# Patient Record
Sex: MALE | Race: WHITE | URBAN - METROPOLITAN AREA
[De-identification: names, ages, dates, MRNs, and addresses within clinical notes are randomized per-mention and may not be internally consistent; named-entity substitution may affect disease eponyms.]

---

## 2019-07-30 ENCOUNTER — OUTPATIENT (OUTPATIENT)
Dept: OUTPATIENT SERVICES | Facility: HOSPITAL | Age: 84
LOS: 1 days | End: 2019-07-30

## 2019-07-30 ENCOUNTER — EMERGENCY (EMERGENCY)
Facility: HOSPITAL | Age: 84
LOS: 1 days | End: 2019-07-30
Admitting: INTERNAL MEDICINE
Payer: MEDICARE

## 2019-07-30 PROCEDURE — 99284 EMERGENCY DEPT VISIT MOD MDM: CPT

## 2019-07-30 PROCEDURE — 93010 ELECTROCARDIOGRAM REPORT: CPT

## 2019-07-30 PROCEDURE — 70450 CT HEAD/BRAIN W/O DYE: CPT | Mod: 26

## 2019-07-30 PROCEDURE — 71046 X-RAY EXAM CHEST 2 VIEWS: CPT | Mod: 26

## 2019-07-31 ENCOUNTER — OUTPATIENT (OUTPATIENT)
Dept: OUTPATIENT SERVICES | Facility: HOSPITAL | Age: 84
LOS: 1 days | End: 2019-07-31

## 2019-07-31 PROCEDURE — 99285 EMERGENCY DEPT VISIT HI MDM: CPT

## 2019-09-08 ENCOUNTER — INPATIENT (INPATIENT)
Facility: HOSPITAL | Age: 84
LOS: 1 days | Discharge: HOME | DRG: 303 | End: 2019-09-10
Attending: INTERNAL MEDICINE | Admitting: HOSPITALIST
Payer: OTHER GOVERNMENT

## 2019-09-08 VITALS
RESPIRATION RATE: 20 BRPM | TEMPERATURE: 98 F | DIASTOLIC BLOOD PRESSURE: 78 MMHG | HEART RATE: 68 BPM | HEIGHT: 71 IN | SYSTOLIC BLOOD PRESSURE: 151 MMHG | WEIGHT: 223.11 LBS | OXYGEN SATURATION: 99 %

## 2019-09-08 DIAGNOSIS — Z95.2 PRESENCE OF PROSTHETIC HEART VALVE: Chronic | ICD-10-CM

## 2019-09-08 DIAGNOSIS — I20.0 UNSTABLE ANGINA: ICD-10-CM

## 2019-09-08 LAB
ALBUMIN SERPL ELPH-MCNC: 3.3 G/DL — SIGNIFICANT CHANGE UP (ref 3.3–5.2)
ALP SERPL-CCNC: 129 U/L — HIGH (ref 40–120)
ALT FLD-CCNC: 12 U/L — SIGNIFICANT CHANGE UP
ANION GAP SERPL CALC-SCNC: 7 MMOL/L — SIGNIFICANT CHANGE UP (ref 5–17)
AST SERPL-CCNC: 18 U/L — SIGNIFICANT CHANGE UP
BILIRUB SERPL-MCNC: 0.7 MG/DL — SIGNIFICANT CHANGE UP (ref 0.4–2)
BUN SERPL-MCNC: 18 MG/DL — SIGNIFICANT CHANGE UP (ref 8–20)
CALCIUM SERPL-MCNC: 10.3 MG/DL — HIGH (ref 8.6–10.2)
CHLORIDE SERPL-SCNC: 104 MMOL/L — SIGNIFICANT CHANGE UP (ref 98–107)
CO2 SERPL-SCNC: 26 MMOL/L — SIGNIFICANT CHANGE UP (ref 22–29)
CREAT SERPL-MCNC: 0.79 MG/DL — SIGNIFICANT CHANGE UP (ref 0.5–1.3)
GLUCOSE SERPL-MCNC: 125 MG/DL — HIGH (ref 70–115)
HCT VFR BLD CALC: 34.1 % — LOW (ref 39–50)
HGB BLD-MCNC: 10.9 G/DL — LOW (ref 13–17)
LIDOCAIN IGE QN: 25 U/L — SIGNIFICANT CHANGE UP (ref 22–51)
MCHC RBC-ENTMCNC: 30.6 PG — SIGNIFICANT CHANGE UP (ref 27–34)
MCHC RBC-ENTMCNC: 32 GM/DL — SIGNIFICANT CHANGE UP (ref 32–36)
MCV RBC AUTO: 95.8 FL — SIGNIFICANT CHANGE UP (ref 80–100)
NT-PROBNP SERPL-SCNC: 392 PG/ML — HIGH (ref 0–300)
PLATELET # BLD AUTO: 184 K/UL — SIGNIFICANT CHANGE UP (ref 150–400)
POTASSIUM SERPL-MCNC: 3.7 MMOL/L — SIGNIFICANT CHANGE UP (ref 3.5–5.3)
POTASSIUM SERPL-SCNC: 3.7 MMOL/L — SIGNIFICANT CHANGE UP (ref 3.5–5.3)
PROT SERPL-MCNC: 6 G/DL — LOW (ref 6.6–8.7)
RBC # BLD: 3.56 M/UL — LOW (ref 4.2–5.8)
RBC # FLD: 14.1 % — SIGNIFICANT CHANGE UP (ref 10.3–14.5)
SODIUM SERPL-SCNC: 137 MMOL/L — SIGNIFICANT CHANGE UP (ref 135–145)
TROPONIN T SERPL-MCNC: <0.01 NG/ML — SIGNIFICANT CHANGE UP (ref 0–0.06)
WBC # BLD: 7.39 K/UL — SIGNIFICANT CHANGE UP (ref 3.8–10.5)
WBC # FLD AUTO: 7.39 K/UL — SIGNIFICANT CHANGE UP (ref 3.8–10.5)

## 2019-09-08 PROCEDURE — 71046 X-RAY EXAM CHEST 2 VIEWS: CPT | Mod: 26

## 2019-09-08 PROCEDURE — 99285 EMERGENCY DEPT VISIT HI MDM: CPT

## 2019-09-08 PROCEDURE — 99223 1ST HOSP IP/OBS HIGH 75: CPT

## 2019-09-08 RX ORDER — METOPROLOL TARTRATE 50 MG
25 TABLET ORAL
Refills: 0 | Status: DISCONTINUED | OUTPATIENT
Start: 2019-09-08 | End: 2019-09-10

## 2019-09-08 RX ORDER — ATORVASTATIN CALCIUM 80 MG/1
20 TABLET, FILM COATED ORAL AT BEDTIME
Refills: 0 | Status: DISCONTINUED | OUTPATIENT
Start: 2019-09-08 | End: 2019-09-10

## 2019-09-08 RX ORDER — HYDROCHLOROTHIAZIDE 25 MG
12.5 TABLET ORAL
Refills: 0 | Status: DISCONTINUED | OUTPATIENT
Start: 2019-09-08 | End: 2019-09-10

## 2019-09-08 RX ORDER — NITROGLYCERIN 6.5 MG
0.4 CAPSULE, EXTENDED RELEASE ORAL
Refills: 0 | Status: COMPLETED | OUTPATIENT
Start: 2019-09-08 | End: 2019-09-09

## 2019-09-08 RX ORDER — LISINOPRIL 2.5 MG/1
5 TABLET ORAL DAILY
Refills: 0 | Status: DISCONTINUED | OUTPATIENT
Start: 2019-09-08 | End: 2019-09-10

## 2019-09-08 RX ORDER — CLOPIDOGREL BISULFATE 75 MG/1
75 TABLET, FILM COATED ORAL DAILY
Refills: 0 | Status: DISCONTINUED | OUTPATIENT
Start: 2019-09-08 | End: 2019-09-10

## 2019-09-08 RX ORDER — ENOXAPARIN SODIUM 100 MG/ML
40 INJECTION SUBCUTANEOUS DAILY
Refills: 0 | Status: DISCONTINUED | OUTPATIENT
Start: 2019-09-08 | End: 2019-09-08

## 2019-09-08 RX ORDER — ASPIRIN/CALCIUM CARB/MAGNESIUM 324 MG
81 TABLET ORAL DAILY
Refills: 0 | Status: DISCONTINUED | OUTPATIENT
Start: 2019-09-08 | End: 2019-09-10

## 2019-09-08 NOTE — ED PROVIDER NOTE - PMH
Essential hypertension    Myocardial infarction, unspecified MI type, unspecified artery    Stented coronary artery

## 2019-09-08 NOTE — H&P ADULT - HISTORY OF PRESENT ILLNESS
86yoM hx HTN, CAD/MI last PCI (7/2018), s/p mechanical aortic valve replacement (~2000) on coumadin, presenting with intermittent chest pain for the past few days.  Reports primarily left-sided dull chest pain radiating to his left shoulder that occurs at rest and on exertion.  Episodes last for a few minutes and have improved with Tylenol and ASA.  Denies any associated dyspnea, diaphoresis, nausea, vomiting.  Of note, reports hx of episodic lightheadedness of unclear etiology over the past few years. Has had multiple prior neuroimaging including CT and MRIs that have been non-revealing and pt says these symptoms haven't improved with meclizine.
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
## 2019-09-08 NOTE — H&P ADULT - NSHPLABSRESULTS_GEN_ALL_CORE
10.9   7.39  )-----------( 184      ( 08 Sep 2019 19:38 )             34.1       09-08    137  |  104  |  18.0  ----------------------------<  125<H>  3.7   |  26.0  |  0.79    Ca    10.3<H>      08 Sep 2019 19:38    TPro  6.0<L>  /  Alb  3.3  /  TBili  0.7  /  DBili  x   /  AST  18  /  ALT  12  /  AlkPhos  129<H>  09-08      EKG: NSR, 1st degree AV block, QRS widening, no ST-T changes, no prior EKG for comparison    CXR: ?RML nodular, no infiltrates or effusions noted

## 2019-09-08 NOTE — H&P ADULT - NSICDXPASTMEDICALHX_GEN_ALL_CORE_FT
PAST MEDICAL HISTORY:  Coronary artery disease     Essential hypertension     Myocardial infarction, unspecified MI type, unspecified artery 7/2018    Stented coronary artery

## 2019-09-08 NOTE — ED ADULT NURSE NOTE - NSIMPLEMENTINTERV_GEN_ALL_ED
Implemented All Fall with Harm Risk Interventions:  Ware to call system. Call bell, personal items and telephone within reach. Instruct patient to call for assistance. Room bathroom lighting operational. Non-slip footwear when patient is off stretcher. Physically safe environment: no spills, clutter or unnecessary equipment. Stretcher in lowest position, wheels locked, appropriate side rails in place. Provide visual cue, wrist band, yellow gown, etc. Monitor gait and stability. Monitor for mental status changes and reorient to person, place, and time. Review medications for side effects contributing to fall risk. Reinforce activity limits and safety measures with patient and family. Provide visual clues: red socks.

## 2019-09-08 NOTE — ED ADULT NURSE REASSESSMENT NOTE - NS ED NURSE REASSESS COMMENT FT1
Pt given turkey sandwich and hot dinner as per his request, MD choudhary to eat regular diet. Pt on cardiac monitor. To be admitted to TELE. Pt aware of plan of care; A/Ox4. Call bell and urinal within reach.

## 2019-09-08 NOTE — ED PROVIDER NOTE - PROGRESS NOTE DETAILS
Patient stable. Will place in obs given heart score of 5. Patient concerned regarding his recurrent dizziness over the past year. However, he endorses that he has been evaluated several times since its onset; labs, 3 ct heads, and MRI which were all normal. He staets the episods occur every morning when he wakes up and gradually wears off within an hour or so. However, he states he does get episodes during the day.

## 2019-09-08 NOTE — H&P ADULT - ASSESSMENT
86yoM hx HTN, CAD/MI last PCI (7/2018), s/p mechanical aortic valve replacement (~2000) on coumadin, presenting with intermittent chest pain for the past few days concerning for unstable angina    #Chest pain in setting of CAD concerning for unstable angina  -EKG showing NSR, 1st degree AV block and QRS widening without any prior EKG for comparison  -First set of troponin negative, repeat in AM  -Resumed ASA, plavix, statin  -Cardiology consult ([facility] as pt cardiologist doesn't come here)   -Pt reports recent TTE done about 1 week at [facility], try to obtain results    #HTN- Metoprolol, lisinopril and HCTZ resumed.    #Status post aortic valve replacement- No admission coag available, will check STAT INR.  Dose coumadin by daily INR.    #Prophylactic measure- on coumadin.
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
## 2019-09-08 NOTE — H&P ADULT - NSHPPHYSICALEXAM_GEN_ALL_CORE
Vital Signs Last 24 Hrs  T(C): 36.6 (08 Sep 2019 19:39), Max: 36.7 (08 Sep 2019 17:30)  T(F): 97.8 (08 Sep 2019 19:39), Max: 98 (08 Sep 2019 17:30)  HR: 61 (08 Sep 2019 19:39) (61 - 68)  BP: 137/65 (08 Sep 2019 19:39) (137/65 - 151/78)  BP(mean): 94 (08 Sep 2019 19:39) (94 - 94)  RR: 18 (08 Sep 2019 19:39) (18 - 20)  SpO2: 100% (08 Sep 2019 19:39) (99% - 100%)    GENERAL:  Well-appearing elderly male, not in acute distress  EYES:  Clear conjunctiva, extraocular movement intact  ENT: Moist mucous membranes  RESP:  Non-labored breathing pattern, lungs clear to ausculation   CV: Regular rate and rhythm, no murmurs appreciated, no lower extremity edema  GI: Soft, non-tender, non-distended  NEURO: Awake, alert, conversant, non-focal  PSYCH: Calm, cooperative  SKIN: No rash or lesions, warm and dry Vital Signs Last 24 Hrs  T(C): 36.6 (08 Sep 2019 19:39), Max: 36.7 (08 Sep 2019 17:30)  T(F): 97.8 (08 Sep 2019 19:39), Max: 98 (08 Sep 2019 17:30)  HR: 61 (08 Sep 2019 19:39) (61 - 68)  BP: 137/65 (08 Sep 2019 19:39) (137/65 - 151/78)  BP(mean): 94 (08 Sep 2019 19:39) (94 - 94)  RR: 18 (08 Sep 2019 19:39) (18 - 20)  SpO2: 100% (08 Sep 2019 19:39) (99% - 100%)    GENERAL:  Well-appearing elderly male, not in acute distress  EYES:  Clear conjunctiva, extraocular movement intact  ENT: Moist mucous membranes  RESP:  Non-labored breathing pattern, lungs clear to ausculation   CV: Regular rate and rhythm, +valve click, no murmurs appreciated, no lower extremity edema  GI: Soft, non-tender, non-distended  NEURO: Awake, alert, conversant, non-focal  PSYCH: Calm, cooperative  SKIN: No rash or lesions, warm and dry
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
## 2019-09-08 NOTE — ED PROVIDER NOTE - OBJECTIVE STATEMENT
85 yo bubba with hx of htn, MI w/ one stent presenting today for evaluation of recurrent left sided chest pain to the left shoulder  at rest and with exertion but no change with movement of upper extremities.

## 2019-09-08 NOTE — ED ADULT NURSE REASSESSMENT NOTE - NS ED NURSE REASSESS COMMENT FT1
Pt received awake and alert on cardiac monitor. Pt states no pain or discomfort at this time. Pt aware of plan of care - awaiting xray and lab results. Call bell within reach.

## 2019-09-09 DIAGNOSIS — I10 ESSENTIAL (PRIMARY) HYPERTENSION: ICD-10-CM

## 2019-09-09 DIAGNOSIS — I25.10 ATHEROSCLEROTIC HEART DISEASE OF NATIVE CORONARY ARTERY WITHOUT ANGINA PECTORIS: ICD-10-CM

## 2019-09-09 DIAGNOSIS — Z95.2 PRESENCE OF PROSTHETIC HEART VALVE: ICD-10-CM

## 2019-09-09 LAB
ANION GAP SERPL CALC-SCNC: 6 MMOL/L — SIGNIFICANT CHANGE UP (ref 5–17)
APTT BLD: 39.1 SEC — HIGH (ref 27.5–36.3)
BASOPHILS # BLD AUTO: 0.03 K/UL — SIGNIFICANT CHANGE UP (ref 0–0.2)
BASOPHILS NFR BLD AUTO: 0.4 % — SIGNIFICANT CHANGE UP (ref 0–2)
BUN SERPL-MCNC: 22 MG/DL — HIGH (ref 8–20)
CALCIUM SERPL-MCNC: 9.9 MG/DL — SIGNIFICANT CHANGE UP (ref 8.6–10.2)
CHLORIDE SERPL-SCNC: 106 MMOL/L — SIGNIFICANT CHANGE UP (ref 98–107)
CK SERPL-CCNC: 47 U/L — SIGNIFICANT CHANGE UP (ref 30–200)
CO2 SERPL-SCNC: 27 MMOL/L — SIGNIFICANT CHANGE UP (ref 22–29)
CREAT SERPL-MCNC: 1.03 MG/DL — SIGNIFICANT CHANGE UP (ref 0.5–1.3)
EOSINOPHIL # BLD AUTO: 0.54 K/UL — HIGH (ref 0–0.5)
EOSINOPHIL NFR BLD AUTO: 7.8 % — HIGH (ref 0–6)
GLUCOSE SERPL-MCNC: 99 MG/DL — SIGNIFICANT CHANGE UP (ref 70–115)
HCT VFR BLD CALC: 31.9 % — LOW (ref 39–50)
HGB BLD-MCNC: 10.5 G/DL — LOW (ref 13–17)
IMM GRANULOCYTES NFR BLD AUTO: 0.4 % — SIGNIFICANT CHANGE UP (ref 0–1.5)
INR BLD: 1.44 RATIO — HIGH (ref 0.88–1.16)
INR BLD: 1.7 RATIO — HIGH (ref 0.88–1.16)
LYMPHOCYTES # BLD AUTO: 1.23 K/UL — SIGNIFICANT CHANGE UP (ref 1–3.3)
LYMPHOCYTES # BLD AUTO: 17.7 % — SIGNIFICANT CHANGE UP (ref 13–44)
MAGNESIUM SERPL-MCNC: 1.4 MG/DL — LOW (ref 1.6–2.6)
MCHC RBC-ENTMCNC: 31.4 PG — SIGNIFICANT CHANGE UP (ref 27–34)
MCHC RBC-ENTMCNC: 32.9 GM/DL — SIGNIFICANT CHANGE UP (ref 32–36)
MCV RBC AUTO: 95.5 FL — SIGNIFICANT CHANGE UP (ref 80–100)
MONOCYTES # BLD AUTO: 1.06 K/UL — HIGH (ref 0–0.9)
MONOCYTES NFR BLD AUTO: 15.3 % — HIGH (ref 2–14)
NEUTROPHILS # BLD AUTO: 4.05 K/UL — SIGNIFICANT CHANGE UP (ref 1.8–7.4)
NEUTROPHILS NFR BLD AUTO: 58.4 % — SIGNIFICANT CHANGE UP (ref 43–77)
PLATELET # BLD AUTO: 173 K/UL — SIGNIFICANT CHANGE UP (ref 150–400)
POTASSIUM SERPL-MCNC: 4.1 MMOL/L — SIGNIFICANT CHANGE UP (ref 3.5–5.3)
POTASSIUM SERPL-SCNC: 4.1 MMOL/L — SIGNIFICANT CHANGE UP (ref 3.5–5.3)
PROTHROM AB SERPL-ACNC: 16.7 SEC — HIGH (ref 10–12.9)
PROTHROM AB SERPL-ACNC: 19.9 SEC — HIGH (ref 10–12.9)
RBC # BLD: 3.34 M/UL — LOW (ref 4.2–5.8)
RBC # FLD: 14 % — SIGNIFICANT CHANGE UP (ref 10.3–14.5)
SODIUM SERPL-SCNC: 139 MMOL/L — SIGNIFICANT CHANGE UP (ref 135–145)
TROPONIN T SERPL-MCNC: <0.01 NG/ML — SIGNIFICANT CHANGE UP (ref 0–0.06)
WBC # BLD: 6.94 K/UL — SIGNIFICANT CHANGE UP (ref 3.8–10.5)
WBC # FLD AUTO: 6.94 K/UL — SIGNIFICANT CHANGE UP (ref 3.8–10.5)

## 2019-09-09 PROCEDURE — 99233 SBSQ HOSP IP/OBS HIGH 50: CPT

## 2019-09-09 PROCEDURE — 99223 1ST HOSP IP/OBS HIGH 75: CPT

## 2019-09-09 PROCEDURE — 99255 IP/OBS CONSLTJ NEW/EST HI 80: CPT

## 2019-09-09 PROCEDURE — 93010 ELECTROCARDIOGRAM REPORT: CPT

## 2019-09-09 RX ORDER — MAGNESIUM SULFATE 500 MG/ML
2 VIAL (ML) INJECTION ONCE
Refills: 0 | Status: COMPLETED | OUTPATIENT
Start: 2019-09-09 | End: 2019-09-09

## 2019-09-09 RX ORDER — WARFARIN SODIUM 2.5 MG/1
0 TABLET ORAL
Qty: 0 | Refills: 0 | DISCHARGE

## 2019-09-09 RX ORDER — WARFARIN SODIUM 2.5 MG/1
8 TABLET ORAL ONCE
Refills: 0 | Status: DISCONTINUED | OUTPATIENT
Start: 2019-09-09 | End: 2019-09-09

## 2019-09-09 RX ORDER — WARFARIN SODIUM 2.5 MG/1
4 TABLET ORAL ONCE
Refills: 0 | Status: COMPLETED | OUTPATIENT
Start: 2019-09-09 | End: 2019-09-09

## 2019-09-09 RX ORDER — WARFARIN SODIUM 2.5 MG/1
5 TABLET ORAL ONCE
Refills: 0 | Status: COMPLETED | OUTPATIENT
Start: 2019-09-09 | End: 2019-09-09

## 2019-09-09 RX ORDER — ATORVASTATIN CALCIUM 80 MG/1
1 TABLET, FILM COATED ORAL
Qty: 0 | Refills: 0 | DISCHARGE

## 2019-09-09 RX ORDER — CLOPIDOGREL BISULFATE 75 MG/1
1 TABLET, FILM COATED ORAL
Qty: 0 | Refills: 0 | DISCHARGE

## 2019-09-09 RX ORDER — METOPROLOL TARTRATE 50 MG
1 TABLET ORAL
Qty: 0 | Refills: 0 | DISCHARGE

## 2019-09-09 RX ORDER — ASPIRIN/CALCIUM CARB/MAGNESIUM 324 MG
1 TABLET ORAL
Qty: 0 | Refills: 0 | DISCHARGE

## 2019-09-09 RX ORDER — LISINOPRIL 2.5 MG/1
1 TABLET ORAL
Qty: 0 | Refills: 0 | DISCHARGE

## 2019-09-09 RX ADMIN — Medication 25 MILLIGRAM(S): at 05:33

## 2019-09-09 RX ADMIN — WARFARIN SODIUM 5 MILLIGRAM(S): 2.5 TABLET ORAL at 21:28

## 2019-09-09 RX ADMIN — Medication 81 MILLIGRAM(S): at 10:28

## 2019-09-09 RX ADMIN — Medication 12.5 MILLIGRAM(S): at 05:33

## 2019-09-09 RX ADMIN — ATORVASTATIN CALCIUM 20 MILLIGRAM(S): 80 TABLET, FILM COATED ORAL at 21:18

## 2019-09-09 RX ADMIN — Medication 25 MILLIGRAM(S): at 18:16

## 2019-09-09 RX ADMIN — WARFARIN SODIUM 4 MILLIGRAM(S): 2.5 TABLET ORAL at 03:59

## 2019-09-09 RX ADMIN — Medication 50 GRAM(S): at 10:28

## 2019-09-09 RX ADMIN — Medication 0.4 MILLIGRAM(S): at 03:04

## 2019-09-09 RX ADMIN — Medication 12.5 MILLIGRAM(S): at 18:16

## 2019-09-09 RX ADMIN — Medication 0.4 MILLIGRAM(S): at 02:37

## 2019-09-09 RX ADMIN — LISINOPRIL 5 MILLIGRAM(S): 2.5 TABLET ORAL at 05:32

## 2019-09-09 RX ADMIN — Medication 0.4 MILLIGRAM(S): at 03:47
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
## 2019-09-09 NOTE — PROGRESS NOTE ADULT - ASSESSMENT
The patient is an 86 year old male with a history of hypertension, CAD status post PCI in 2018, status post mechanical aortic valve replacement in 2000 on coumadin who presented to the ER with complaints of chest pain.     Assessment/Plan:    1. Chest pain rule out ACS: Serial enzymes negative  Follow up echocardiogram  Cardiology consulted- NST in AM  Continue aspirin, statin, plavix, bb and acei    2. Hypertension: controlled  Continue home medications    3. Status post AVR: INR subtherapeutic  Continue coumadin 8mg PO X 1 tonight  Monitor PT/INR

## 2019-09-09 NOTE — PROGRESS NOTE ADULT - SUBJECTIVE AND OBJECTIVE BOX
CC: Follow up chest pain    INTERVAL HPI/OVERNIGHT EVENTS: Patient seen and examined, chest pain resolved. Denies sob or palpitations       Vital Signs Last 24 Hrs  T(C): 36.4 (09 Sep 2019 11:11), Max: 36.7 (08 Sep 2019 17:30)  T(F): 97.6 (09 Sep 2019 11:11), Max: 98 (08 Sep 2019 17:30)  HR: 77 (09 Sep 2019 11:11) (61 - 77)  BP: 136/75 (09 Sep 2019 11:11) (112/61 - 151/78)  BP(mean): 97 (09 Sep 2019 00:07) (94 - 97)  RR: 18 (09 Sep 2019 11:11) (18 - 20)  SpO2: 99% (09 Sep 2019 11:11) (98% - 100%)    PHYSICAL EXAM:    GENERAL: NAD, AOX3  HEAD:  Atraumatic, Normocephalic  EYES: EOMI, PERRLA, conjunctiva and sclera clear  ENMT: Moist mucous membranes  NECK: Supple, No JVD  CHEST/LUNG: Clear to auscultation bilaterally; No rales, rhonchi, wheezing, or rubs  HEART: Regular rate and rhythm; No murmurs, rubs, or gallops  ABDOMEN: Soft, Nontender, Nondistended; Bowel sounds present  EXTREMITIES:  2+ Peripheral Pulses, No clubbing, cyanosis, or edema        MEDICATIONS  (STANDING):  aspirin enteric coated 81 milliGRAM(s) Oral daily  atorvastatin 20 milliGRAM(s) Oral at bedtime  clopidogrel Tablet 75 milliGRAM(s) Oral daily  hydrochlorothiazide 12.5 milliGRAM(s) Oral two times a day  lisinopril 5 milliGRAM(s) Oral daily  metoprolol tartrate 25 milliGRAM(s) Oral two times a day    MEDICATIONS  (PRN):      Allergies    No Known Allergies    Intolerances          LABS:                          10.5   6.94  )-----------( 173      ( 09 Sep 2019 04:16 )             31.9     09-09    139  |  106  |  22.0<H>  ----------------------------<  99  4.1   |  27.0  |  1.03    Ca    9.9      09 Sep 2019 04:16  Mg     1.4     09-09    TPro  6.0<L>  /  Alb  3.3  /  TBili  0.7  /  DBili  x   /  AST  18  /  ALT  12  /  AlkPhos  129<H>  09-08    PT/INR - ( 09 Sep 2019 11:57 )   PT: 16.7 sec;   INR: 1.44 ratio         PTT - ( 08 Sep 2019 23:59 )  PTT:39.1 sec      RADIOLOGY & ADDITIONAL TESTS:
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
## 2019-09-09 NOTE — ED ADULT NURSE REASSESSMENT NOTE - NS ED NURSE REASSESS COMMENT FT1
Pt awake and alert laying in bed on cardiac monitor. Pt states not pain at this time. Pt aware of plan of care - awaiting tele bed for admission. Call bell within reach.

## 2019-09-09 NOTE — CONSULT NOTE ADULT - PROBLEM SELECTOR RECOMMENDATION 9
- history PCI 7/19; Dr. Evans [facility] [place]  - Troponin neg x 2  - TTE pending  - Nuclear Stress tomorrow   - NPO after midnight  - continue asa/plavix for now  - continue BB/Statin

## 2019-09-10 VITALS
OXYGEN SATURATION: 97 % | SYSTOLIC BLOOD PRESSURE: 116 MMHG | DIASTOLIC BLOOD PRESSURE: 67 MMHG | TEMPERATURE: 98 F | HEART RATE: 83 BPM

## 2019-09-10 LAB
ANION GAP SERPL CALC-SCNC: 12 MMOL/L — SIGNIFICANT CHANGE UP (ref 5–17)
APTT BLD: 36.8 SEC — HIGH (ref 27.5–36.3)
BUN SERPL-MCNC: 17 MG/DL — SIGNIFICANT CHANGE UP (ref 8–20)
CALCIUM SERPL-MCNC: 10.3 MG/DL — HIGH (ref 8.6–10.2)
CHLORIDE SERPL-SCNC: 106 MMOL/L — SIGNIFICANT CHANGE UP (ref 98–107)
CO2 SERPL-SCNC: 23 MMOL/L — SIGNIFICANT CHANGE UP (ref 22–29)
CREAT SERPL-MCNC: 0.86 MG/DL — SIGNIFICANT CHANGE UP (ref 0.5–1.3)
GLUCOSE SERPL-MCNC: 121 MG/DL — HIGH (ref 70–115)
HCT VFR BLD CALC: 37.1 % — LOW (ref 39–50)
HGB BLD-MCNC: 12 G/DL — LOW (ref 13–17)
INR BLD: 1.32 RATIO — HIGH (ref 0.88–1.16)
MAGNESIUM SERPL-MCNC: 1.6 MG/DL — SIGNIFICANT CHANGE UP (ref 1.6–2.6)
MCHC RBC-ENTMCNC: 30.5 PG — SIGNIFICANT CHANGE UP (ref 27–34)
MCHC RBC-ENTMCNC: 32.3 GM/DL — SIGNIFICANT CHANGE UP (ref 32–36)
MCV RBC AUTO: 94.4 FL — SIGNIFICANT CHANGE UP (ref 80–100)
PLATELET # BLD AUTO: 193 K/UL — SIGNIFICANT CHANGE UP (ref 150–400)
POTASSIUM SERPL-MCNC: 3.8 MMOL/L — SIGNIFICANT CHANGE UP (ref 3.5–5.3)
POTASSIUM SERPL-SCNC: 3.8 MMOL/L — SIGNIFICANT CHANGE UP (ref 3.5–5.3)
PROTHROM AB SERPL-ACNC: 15.3 SEC — HIGH (ref 10–12.9)
RBC # BLD: 3.93 M/UL — LOW (ref 4.2–5.8)
RBC # FLD: 14 % — SIGNIFICANT CHANGE UP (ref 10.3–14.5)
SODIUM SERPL-SCNC: 141 MMOL/L — SIGNIFICANT CHANGE UP (ref 135–145)
WBC # BLD: 7.58 K/UL — SIGNIFICANT CHANGE UP (ref 3.8–10.5)
WBC # FLD AUTO: 7.58 K/UL — SIGNIFICANT CHANGE UP (ref 3.8–10.5)

## 2019-09-10 PROCEDURE — 84484 ASSAY OF TROPONIN QUANT: CPT

## 2019-09-10 PROCEDURE — 36415 COLL VENOUS BLD VENIPUNCTURE: CPT

## 2019-09-10 PROCEDURE — 99238 HOSP IP/OBS DSCHRG MGMT 30/<: CPT

## 2019-09-10 PROCEDURE — 93005 ELECTROCARDIOGRAM TRACING: CPT

## 2019-09-10 PROCEDURE — 83735 ASSAY OF MAGNESIUM: CPT

## 2019-09-10 PROCEDURE — 99285 EMERGENCY DEPT VISIT HI MDM: CPT

## 2019-09-10 PROCEDURE — 78452 HT MUSCLE IMAGE SPECT MULT: CPT

## 2019-09-10 PROCEDURE — 85610 PROTHROMBIN TIME: CPT

## 2019-09-10 PROCEDURE — 82550 ASSAY OF CK (CPK): CPT

## 2019-09-10 PROCEDURE — 93017 CV STRESS TEST TRACING ONLY: CPT

## 2019-09-10 PROCEDURE — 78452 HT MUSCLE IMAGE SPECT MULT: CPT | Mod: 26

## 2019-09-10 PROCEDURE — 93306 TTE W/DOPPLER COMPLETE: CPT

## 2019-09-10 PROCEDURE — 85027 COMPLETE CBC AUTOMATED: CPT

## 2019-09-10 PROCEDURE — 93018 CV STRESS TEST I&R ONLY: CPT

## 2019-09-10 PROCEDURE — 80048 BASIC METABOLIC PNL TOTAL CA: CPT

## 2019-09-10 PROCEDURE — 83690 ASSAY OF LIPASE: CPT

## 2019-09-10 PROCEDURE — A9500: CPT

## 2019-09-10 PROCEDURE — 71046 X-RAY EXAM CHEST 2 VIEWS: CPT

## 2019-09-10 PROCEDURE — 83880 ASSAY OF NATRIURETIC PEPTIDE: CPT

## 2019-09-10 PROCEDURE — 80053 COMPREHEN METABOLIC PANEL: CPT

## 2019-09-10 PROCEDURE — 93016 CV STRESS TEST SUPVJ ONLY: CPT

## 2019-09-10 PROCEDURE — 93306 TTE W/DOPPLER COMPLETE: CPT | Mod: 26

## 2019-09-10 PROCEDURE — 85730 THROMBOPLASTIN TIME PARTIAL: CPT

## 2019-09-10 RX ORDER — WARFARIN SODIUM 2.5 MG/1
8 TABLET ORAL ONCE
Refills: 0 | Status: DISCONTINUED | OUTPATIENT
Start: 2019-09-10 | End: 2019-09-10

## 2019-09-10 RX ADMIN — LISINOPRIL 5 MILLIGRAM(S): 2.5 TABLET ORAL at 05:01

## 2019-09-10 RX ADMIN — CLOPIDOGREL BISULFATE 75 MILLIGRAM(S): 75 TABLET, FILM COATED ORAL at 13:14

## 2019-09-10 RX ADMIN — Medication 12.5 MILLIGRAM(S): at 05:01

## 2019-09-10 RX ADMIN — Medication 81 MILLIGRAM(S): at 13:14

## 2019-09-10 NOTE — DISCHARGE NOTE PROVIDER - NSDCCPCAREPLAN_GEN_ALL_CORE_FT
PRINCIPAL DISCHARGE DIAGNOSIS  Diagnosis: Unstable angina pectoris  Assessment and Plan of Treatment:       SECONDARY DISCHARGE DIAGNOSES  Diagnosis: Aortic valve replaced  Assessment and Plan of Treatment: Aortic valve replaced PRINCIPAL DISCHARGE DIAGNOSIS  Diagnosis: Unstable angina pectoris  Assessment and Plan of Treatment: Work up negative  Follow up with your cardiologist in 2 weeks      SECONDARY DISCHARGE DIAGNOSES  Diagnosis: Aortic valve replaced  Assessment and Plan of Treatment: continue coumadin

## 2019-09-10 NOTE — PROGRESS NOTE ADULT - SUBJECTIVE AND OBJECTIVE BOX
CC: Follow up    INTERVAL HPI/OVERNIGHT EVENTS: Patient seen and examined, denies chest pain, sob palpitations.       Vital Signs Last 24 Hrs  T(C): 36.5 (10 Sep 2019 07:43), Max: 36.6 (09 Sep 2019 15:13)  T(F): 97.7 (10 Sep 2019 07:43), Max: 97.9 (10 Sep 2019 04:14)  HR: 73 (10 Sep 2019 07:43) (69 - 76)  BP: 127/69 (10 Sep 2019 07:43) (127/69 - 149/79)  BP(mean): --  RR: 18 (10 Sep 2019 07:43) (18 - 18)  SpO2: 100% (10 Sep 2019 07:43) (94% - 100%)    PHYSICAL EXAM:    GENERAL: NAD, AOx3  NECK: Supple, No JVD  CHEST/LUNG: Clear to auscultation bilaterally; No rales, rhonchi, wheezing, or rubs  HEART: Regular rate and rhythm; No murmurs, rubs, or gallops  ABDOMEN: Soft, Nontender, Nondistended; Bowel sounds present  EXTREMITIES:  2+ Peripheral Pulses, No clubbing, cyanosis, or edema        MEDICATIONS  (STANDING):  aspirin enteric coated 81 milliGRAM(s) Oral daily  atorvastatin 20 milliGRAM(s) Oral at bedtime  clopidogrel Tablet 75 milliGRAM(s) Oral daily  hydrochlorothiazide 12.5 milliGRAM(s) Oral two times a day  lisinopril 5 milliGRAM(s) Oral daily  metoprolol tartrate 25 milliGRAM(s) Oral two times a day  warfarin 8 milliGRAM(s) Oral once    MEDICATIONS  (PRN):      Allergies    No Known Allergies    Intolerances          LABS:                          12.0   7.58  )-----------( 193      ( 10 Sep 2019 07:50 )             37.1     09-10    141  |  106  |  17.0  ----------------------------<  121<H>  3.8   |  23.0  |  0.86    Ca    10.3<H>      10 Sep 2019 07:50  Mg     1.6     09-10    TPro  6.0<L>  /  Alb  3.3  /  TBili  0.7  /  DBili  x   /  AST  18  /  ALT  12  /  AlkPhos  129<H>  09-08    PT/INR - ( 10 Sep 2019 07:50 )   PT: 15.3 sec;   INR: 1.32 ratio         PTT - ( 10 Sep 2019 07:50 )  PTT:36.8 sec      RADIOLOGY & ADDITIONAL TESTS:

## 2019-09-10 NOTE — PROGRESS NOTE ADULT - ASSESSMENT
The patient is an 86 year old male with a history of hypertension, CAD status post PCI in 2018, status post mechanical aortic valve replacement in 2000 on coumadin who presented to the ER with complaints of chest pain. Cardiac enzymes were negative. Echocardiogram showed Ef of 65-70% with normal LVSF, grade 1 diastolic dysfunction. Status post NST.     Assessment/Plan:    1. Chest pain rule out ACS: Serial enzymes negative  Echocardiogram reviewed  Status post NST results pending   Continue aspirin, statin, plavix, bb and acei    2. Hypertension: controlled  Continue home medications    3. Status post AVR: INR subtherapeutic  Continue coumadin 8mg PO X 1 tonight  Monitor PT/INR    Discharge home pending results of NST

## 2019-09-10 NOTE — DISCHARGE NOTE NURSING/CASE MANAGEMENT/SOCIAL WORK - PATIENT PORTAL LINK FT
You can access the FollowMyHealth Patient Portal offered by [facility] by registering at the following website: [email or web address]. By joining [facility]’s FollowMyHealth portal, you will also be able to view your health information using other applications (apps) compatible with our system.

## 2019-09-10 NOTE — DISCHARGE NOTE PROVIDER - HOSPITAL COURSE
The patient is an 86 year old male with a history of hypertension, CAD status post PCI in 2018, status post mechanical aortic valve replacement in 2000 on coumadin who presented to the ER with complaints of chest pain. The patient is an 86 year old The patient is an 86 year old male with a history of hypertension, CAD status post PCI in 2018, status post mechanical aortic valve replacement in 2000 on coumadin who presented to the ER with complaints of chest pain. Cardiac enzymes were negative. Echocardiogram showed Ef of 65-70% with normal LVSF, grade 1 diastolic dysfunction. Status post NST.         Discharged home in stable condition. 48 mins spent

## 2019-09-13 ENCOUNTER — EMERGENCY (EMERGENCY)
Facility: HOSPITAL | Age: 84
LOS: 1 days | Discharge: HOME | End: 2019-09-13
Attending: EMERGENCY MEDICINE
Payer: OTHER GOVERNMENT

## 2019-09-13 VITALS
DIASTOLIC BLOOD PRESSURE: 71 MMHG | HEIGHT: 71 IN | RESPIRATION RATE: 16 BRPM | HEART RATE: 84 BPM | SYSTOLIC BLOOD PRESSURE: 120 MMHG | WEIGHT: 223.11 LBS | OXYGEN SATURATION: 97 % | TEMPERATURE: 98 F

## 2019-09-13 DIAGNOSIS — Z95.2 PRESENCE OF PROSTHETIC HEART VALVE: Chronic | ICD-10-CM

## 2019-09-13 PROBLEM — I21.9 ACUTE MYOCARDIAL INFARCTION, UNSPECIFIED: Chronic | Status: ACTIVE | Noted: 2019-09-08

## 2019-09-13 PROBLEM — Z95.5 PRESENCE OF CORONARY ANGIOPLASTY IMPLANT AND GRAFT: Chronic | Status: ACTIVE | Noted: 2019-09-08

## 2019-09-13 PROBLEM — I25.10 ATHEROSCLEROTIC HEART DISEASE OF NATIVE CORONARY ARTERY WITHOUT ANGINA PECTORIS: Chronic | Status: ACTIVE | Noted: 2019-09-08

## 2019-09-13 PROBLEM — I10 ESSENTIAL (PRIMARY) HYPERTENSION: Chronic | Status: ACTIVE | Noted: 2019-09-08

## 2019-09-13 LAB
ALBUMIN SERPL ELPH-MCNC: 3.4 G/DL — SIGNIFICANT CHANGE UP (ref 3.3–5.2)
ALP SERPL-CCNC: 146 U/L — HIGH (ref 40–120)
ALT FLD-CCNC: 21 U/L — SIGNIFICANT CHANGE UP
ANION GAP SERPL CALC-SCNC: 13 MMOL/L — SIGNIFICANT CHANGE UP (ref 5–17)
APPEARANCE UR: CLEAR — SIGNIFICANT CHANGE UP
APTT BLD: 33.3 SEC — SIGNIFICANT CHANGE UP (ref 27.5–36.3)
AST SERPL-CCNC: 27 U/L — SIGNIFICANT CHANGE UP
BILIRUB SERPL-MCNC: 0.7 MG/DL — SIGNIFICANT CHANGE UP (ref 0.4–2)
BILIRUB UR-MCNC: NEGATIVE — SIGNIFICANT CHANGE UP
BUN SERPL-MCNC: 32 MG/DL — HIGH (ref 8–20)
CALCIUM SERPL-MCNC: 10.5 MG/DL — HIGH (ref 8.6–10.2)
CHLORIDE SERPL-SCNC: 105 MMOL/L — SIGNIFICANT CHANGE UP (ref 98–107)
CO2 SERPL-SCNC: 22 MMOL/L — SIGNIFICANT CHANGE UP (ref 22–29)
COLOR SPEC: YELLOW — SIGNIFICANT CHANGE UP
CREAT SERPL-MCNC: 1.14 MG/DL — SIGNIFICANT CHANGE UP (ref 0.5–1.3)
DIFF PNL FLD: NEGATIVE — SIGNIFICANT CHANGE UP
GLUCOSE SERPL-MCNC: 143 MG/DL — HIGH (ref 70–115)
GLUCOSE UR QL: NEGATIVE MG/DL — SIGNIFICANT CHANGE UP
HCT VFR BLD CALC: 36.2 % — LOW (ref 39–50)
HGB BLD-MCNC: 11.7 G/DL — LOW (ref 13–17)
INR BLD: 1.41 RATIO — HIGH (ref 0.88–1.16)
KETONES UR-MCNC: NEGATIVE — SIGNIFICANT CHANGE UP
LEUKOCYTE ESTERASE UR-ACNC: NEGATIVE — SIGNIFICANT CHANGE UP
MCHC RBC-ENTMCNC: 30.8 PG — SIGNIFICANT CHANGE UP (ref 27–34)
MCHC RBC-ENTMCNC: 32.3 GM/DL — SIGNIFICANT CHANGE UP (ref 32–36)
MCV RBC AUTO: 95.3 FL — SIGNIFICANT CHANGE UP (ref 80–100)
NITRITE UR-MCNC: NEGATIVE — SIGNIFICANT CHANGE UP
PH UR: 6 — SIGNIFICANT CHANGE UP (ref 5–8)
PLATELET # BLD AUTO: 196 K/UL — SIGNIFICANT CHANGE UP (ref 150–400)
POTASSIUM SERPL-MCNC: 3.9 MMOL/L — SIGNIFICANT CHANGE UP (ref 3.5–5.3)
POTASSIUM SERPL-SCNC: 3.9 MMOL/L — SIGNIFICANT CHANGE UP (ref 3.5–5.3)
PROT SERPL-MCNC: 6.3 G/DL — LOW (ref 6.6–8.7)
PROT UR-MCNC: NEGATIVE MG/DL — SIGNIFICANT CHANGE UP
PROTHROM AB SERPL-ACNC: 16.4 SEC — HIGH (ref 10–12.9)
RBC # BLD: 3.8 M/UL — LOW (ref 4.2–5.8)
RBC # FLD: 14.3 % — SIGNIFICANT CHANGE UP (ref 10.3–14.5)
SODIUM SERPL-SCNC: 140 MMOL/L — SIGNIFICANT CHANGE UP (ref 135–145)
SP GR SPEC: 1.02 — SIGNIFICANT CHANGE UP (ref 1.01–1.02)
TROPONIN T SERPL-MCNC: <0.01 NG/ML — SIGNIFICANT CHANGE UP (ref 0–0.06)
TROPONIN T SERPL-MCNC: <0.01 NG/ML — SIGNIFICANT CHANGE UP (ref 0–0.06)
UROBILINOGEN FLD QL: 1 MG/DL
WBC # BLD: 6.42 K/UL — SIGNIFICANT CHANGE UP (ref 3.8–10.5)
WBC # FLD AUTO: 6.42 K/UL — SIGNIFICANT CHANGE UP (ref 3.8–10.5)

## 2019-09-13 PROCEDURE — 71045 X-RAY EXAM CHEST 1 VIEW: CPT | Mod: 26

## 2019-09-13 PROCEDURE — 99285 EMERGENCY DEPT VISIT HI MDM: CPT

## 2019-09-13 PROCEDURE — 93010 ELECTROCARDIOGRAM REPORT: CPT

## 2019-09-13 RX ORDER — SODIUM CHLORIDE 9 MG/ML
3 INJECTION INTRAMUSCULAR; INTRAVENOUS; SUBCUTANEOUS ONCE
Refills: 0 | Status: COMPLETED | OUTPATIENT
Start: 2019-09-13 | End: 2019-09-13

## 2019-09-13 RX ADMIN — SODIUM CHLORIDE 3 MILLILITER(S): 9 INJECTION INTRAMUSCULAR; INTRAVENOUS; SUBCUTANEOUS at 14:16

## 2019-09-13 NOTE — ED PROVIDER NOTE - PLAN OF CARE
1. Return to ED for worsening, progressive or any other concerning symptoms   2. Follow up with your primary care doctor in 2-3days   3. follow up with your cardiologist

## 2019-09-13 NOTE — CONSULT NOTE ADULT - SUBJECTIVE AND OBJECTIVE BOX
[facility] AT [place]                                                                                                 ([facility])                                                                                                 CONSULTATION NOTE       History obtained by: Patient, family and medical record     obtained: No    Primary Cardiologist:  NONE    Chief complaint:      Patient is a 86y old  Male who presents with a chief complaint of Near Syncope    HPI:    86M hx CAD/MI most recent PCI in [place] in 07/2018, s/p mechanical aortic valve ~2000, HTN, who was at [facility] 9/9/2019 with chest pain and had NST (Normal Study), Echo (Nl LVEF, Mechanical Aortic valve with overall acceptable function) presented to ED with c/o near syncope. Pt was in the bathroom today and was standing, he felt weak and needed to sit down. He didn't pass out. Admitted CP. e states he has a lot of personal problems and that seems to aggravate  him.  Denied dyspnea, orthopnea, PND, edema palpitation nausea, vomiting, hematuria melena, seizure activity.      REVIEW OF SYMPTOMS:   Constitutional: Denied: fever, chills, weight loss or gain  Eyes: Denied: reddened ayes, eye discharge, eye pain  ENMT: Denied: ear pain, nasal discharge, mouth pain, throat pain or swelling  Cardiovascular: Denied:  palpitation, arrhythmia, irregular or fast heart beats, edema  Respiratory: Denied: cough, phlegm production, wheezes, dyspnea, orthopnea, PND,   GI: Denied: Abdominal pain, nausea, vomiting, diarrhea, constipation, melena, rectal bleed  : Denied: hematuria, frequency  Musculoskeletal: Denied: Muscle aches, weakness, pain  Hematology/lymp: Denied: Bleeding disorder, anemia, blood clotting  Neuro: Denied: Headache, light headedness, dizziness, numbness, aphasia, dysarthria, seizure,    Psych: Denied: depression, anxiety  Integumentary/skin: Denied: rash, bruises, ecchymosis, itching  Allergy/Immunology: denied environmental or drug allergies    ALL OTHER REVIEW OF SYSTEMS ARE NEGATIVE.    MEDICATIONS  (STANDING):    MEDICATIONS  (PRN):      Allergies:        Allergies:  	No Known Allergies:     Home Medications:   * Patient Currently Takes Medications as of 10-Sep-2019 13:34 documented in Structured Notes  · 	aspirin 81 mg oral tablet: 1 tab(s) orally once a day  · 	lisinopril 5 mg oral tablet: 1 tab(s) orally once a day  · 	Plavix 75 mg oral tablet: 1 tab(s) orally once a day  · 	metoprolol tartrate 25 mg oral tablet: 1 tab(s) orally 2 times a day  · 	Lipitor 20 mg oral tablet: 1 tab(s) orally once a day  · 	Coumadin: 8mg on Mon-Fri, 4mg on Sat and Sun   · 	hydroCHLOROthiazide 12.5 mg oral capsule: 1 cap(s) orally 2 times a day        PAST MEDICAL & SURGICAL HISTORY:  Coronary artery disease  Stented coronary artery  Myocardial infarction, unspecified MI type, unspecified artery: 7/2018  Essential hypertension  H/O aortic valve replacement: ~2000      FAMILY HISTORY:  No pertinent family history in first degree relatives    Non-Contributory    SOCIAL HISTORY:    CIGARETTES:  Denies    ALCOHOL: Denies    DRUGS: Denies    Vital Signs Last 24 Hrs  T(C): 36.4 (13 Sep 2019 13:10), Max: 36.4 (13 Sep 2019 13:10)  T(F): 97.5 (13 Sep 2019 13:10), Max: 97.5 (13 Sep 2019 13:10)  HR: 84 (13 Sep 2019 13:10) (84 - 84)  BP: 120/71 (13 Sep 2019 13:10) (120/71 - 120/71)  BP(mean): --  RR: 16 (13 Sep 2019 13:10) (16 - 16)  SpO2: 97% (13 Sep 2019 13:10) (97% - 97%)    PHYSICAL EXAM:    Constitutional: No fever, chills, NAD, Comfortable    Eyes: Not reddened, no discharge    ENMT: No discharge, No pain    Neck: No JVD, No Bruit    Back: No CVA tenderness    Respiratory: Clear to auscultation bilaterally    Cardiovascular: RRR, Normal S1-2, No S3-, 1/6 SM  LSB, Mechanical AVR    Gastrointestinal: Soft, NT/ND. BS+, No organomegaly    Extremities: No edema    Vascular: Distal pulses intact    Neurological: Alert, awake, oriented, able to move extremities, speech is clear    Skin: No ecchymosis, no rash    Musculoskeletal: Non tender, no weakness    Psychiatric: Appropriate mood, no anxiety      INTERPRETATION OF TELEMETRY:      EKG shows sinus rhythm with LVH with QRS widening (Unchanged)    I&O's Detail      LABS:                        11.7   6.42  )-----------( 196      ( 13 Sep 2019 14:24 )             36.2     09-13    140  |  105  |  32.0<H>  ----------------------------<  143<H>  3.9   |  22.0  |  1.14    Ca    10.5<H>      13 Sep 2019 14:24    TPro  6.3<L>  /  Alb  3.4  /  TBili  0.7  /  DBili  x   /  AST  27  /  ALT  21  /  AlkPhos  146<H>  09-13                CARDIAC MARKERS ( 13 Sep 2019 14:24 )  x     / <0.01 ng/mL / x     / x     / x          PT/INR - ( 13 Sep 2019 14:24 )   PT: 16.4 sec;   INR: 1.41 ratio         PTT - ( 13 Sep 2019 14:24 )  PTT:33.3 sec    I&O's Summary        NUCLEAR FINDINGS:  The left ventricle was normal in size. Normal myocardial  perfusion scan, with no evidence of infarction or  inducible ischemia.  ------------------------------------------------------------------------      GATED ANALYSIS:  Post-stress resting myocardial perfusion gated SPECT  imaging was performed (LVEF > 70%;LVEDV = 88 ml.)  ------------------------------------------------------------------------    IMPRESSIONS:Normal Study  * The left ventricle was normal in size.  * Tracer uptake was homogeneous throughout the left  ventricle.  * Normal study; no evidence for myocardial infarction or  ischemia.  * Gated wall motion analysis was performed, and shows  normal wall motion.  ------------------------------------------------------------------------      ------------------------------------------------------------------------    Confirmed on  9/10/2019 - 13:58:10 at [facility] by  Sandrine Rosen   Cardiology Fellow: Gretchen Tesfaye NP No adenopathy or splenomegaly. No cervical or inguinal lymphadenopathy.

## 2019-09-13 NOTE — ED ADULT NURSE NOTE - NSIMPLEMENTINTERV_GEN_ALL_ED
Implemented All Fall with Harm Risk Interventions:  [place] to call system. Call bell, personal items and telephone within reach. Instruct patient to call for assistance. Room bathroom lighting operational. Non-slip footwear when patient is off stretcher. Physically safe environment: no spills, clutter or unnecessary equipment. Stretcher in lowest position, wheels locked, appropriate side rails in place. Provide visual cue, wrist band, yellow gown, etc. Monitor gait and stability. Monitor for mental status changes and reorient to person, place, and time. Review medications for side effects contributing to fall risk. Reinforce activity limits and safety measures with patient and family. Provide visual clues: red socks.

## 2019-09-13 NOTE — ED PROVIDER NOTE - PATIENT PORTAL LINK FT
You can access the FollowMyHealth Patient Portal offered by [facility] by registering at the following website: [email or web address]. By joining [facility]’s FollowMyHealth portal, you will also be able to view your health information using other applications (apps) compatible with our system.

## 2019-09-13 NOTE — CONSULT NOTE ADULT - ASSESSMENT
86M hx CAD/MI most recent PCI in [place] in 07/2018, s/p mechanical aortic valve ~2000, HTN, who was at [facility] 9/9/2019 with chest pain and had NST (Normal Study), Echo (Nl LVEF, Mechanical Aortic valve with overall acceptable function) presented to ED with c/o near syncope. Pt was in the bathroom today and was standing, he felt weak and needed to sit down. he didn't pass out. Admitted CP. e states he has a lot of personal problems and that seems to aggravate him.  Denied dyspnea, orthopnea, PND, edema palpitation nausea, vomiting, hematuria melena, seizure activity.      Coronary artery disease, CP  NST 9/10: Nl study  Echo 9/9: Nl LVEF, Nl AVR fx  EKG shows sinus rhythm with LVH with QRS widening (Unchanged)  Trop x1 neg  CXR: NAP  trend torp if neg:  Outpt cardiology FU     Near Syncope while in shower  Likely vasovagal  EKG: Sinus Rhythm  Check Orthostatic BP  Avoid dehydration  Outpt Event Monitor    Essential hypertension  monitor BP as per protocol.     s/p Mechanical AVR  Cont Coumadin  goal INR 2.5-3.0    jessie/ Dr. Meyers

## 2019-09-13 NOTE — ED ADULT TRIAGE NOTE - CHIEF COMPLAINT QUOTE
pt comes to ed from home after near syncope in shower. patient reports that he started to feel like he was going to collapse. patient got out of shower and sat down; reports had chest pain and had pain to upper back last night. got asa 324 with ems

## 2019-09-13 NOTE — ED PROVIDER NOTE - CARE PLAN
Principal Discharge DX:	Near syncope  Secondary Diagnosis:	Chest pain, unspecified type Principal Discharge DX:	Near syncope  Assessment and plan of treatment:	1. Return to ED for worsening, progressive or any other concerning symptoms   2. Follow up with your primary care doctor in 2-3days   3. follow up with your cardiologist  Secondary Diagnosis:	Chest pain, unspecified type

## 2019-09-13 NOTE — ED PROVIDER NOTE - OBJECTIVE STATEMENT
85 yo male pmh cad, stents x 2 comes to ed with near syncopal episode in shower followed with chest pain; pt with recent cardiac work up with negative stress test.   pt denies loc, head injury, nausea or vomiting

## 2019-09-13 NOTE — ED ADULT NURSE NOTE - OBJECTIVE STATEMENT
patient c/o near syncope episode at home while getting out of the shower. patient stated has been feeling weak for weeks not, has been in and out of the hospitals. was never diagnosed with anything. patient denies any symptoms at this time.

## 2019-09-13 NOTE — ED ADULT NURSE NOTE - PMH
Coronary artery disease    Essential hypertension    Myocardial infarction, unspecified MI type, unspecified artery  7/2018  Stented coronary artery

## 2019-09-13 NOTE — ED PROVIDER NOTE - PHYSICAL EXAMINATION
Alert, lucid, and in no apparent distress. Pt is normocephalic, atraumatic.  Pupils are equal, round, no dysmetria. External ear without bleeding or mass, no nasal discharge or malodor, lips pink, moist mucous membranes, tongue midline. Neck supple.   Lungs clear to ausultation. Heart regular rate and rhythm, normal S1, S2, no murmurs, gallops, rubs.  Abdomen is soft, nontender, no pulsatile mass, no masses, no distension, no rebound. No CVA Tenderness, no suprapubic tenderness.   Non-focal sensory, 5 out of 5 motor strength, no dysmetria, fluent, goal directed speech. CN2 to 12 intact. Skin without rash, purpura, eccyhmosis  No submandibular adenopathy. Normal mentation, does not appear agitated

## 2019-09-14 VITALS
DIASTOLIC BLOOD PRESSURE: 66 MMHG | SYSTOLIC BLOOD PRESSURE: 132 MMHG | TEMPERATURE: 98 F | HEART RATE: 77 BPM | OXYGEN SATURATION: 99 % | RESPIRATION RATE: 18 BRPM

## 2019-09-14 PROCEDURE — 71045 X-RAY EXAM CHEST 1 VIEW: CPT

## 2019-09-14 PROCEDURE — 85027 COMPLETE CBC AUTOMATED: CPT

## 2019-09-14 PROCEDURE — 85730 THROMBOPLASTIN TIME PARTIAL: CPT

## 2019-09-14 PROCEDURE — 93005 ELECTROCARDIOGRAM TRACING: CPT

## 2019-09-14 PROCEDURE — 85610 PROTHROMBIN TIME: CPT

## 2019-09-14 PROCEDURE — 81003 URINALYSIS AUTO W/O SCOPE: CPT

## 2019-09-14 PROCEDURE — 36415 COLL VENOUS BLD VENIPUNCTURE: CPT

## 2019-09-14 PROCEDURE — 99284 EMERGENCY DEPT VISIT MOD MDM: CPT | Mod: 25

## 2019-09-14 PROCEDURE — 80053 COMPREHEN METABOLIC PANEL: CPT

## 2019-09-14 PROCEDURE — 84484 ASSAY OF TROPONIN QUANT: CPT

## 2019-09-14 RX ORDER — ACETAMINOPHEN 500 MG
975 TABLET ORAL ONCE
Refills: 0 | Status: COMPLETED | OUTPATIENT
Start: 2019-09-14 | End: 2019-09-14

## 2019-09-14 RX ORDER — WARFARIN SODIUM 2.5 MG/1
8 TABLET ORAL ONCE
Refills: 0 | Status: COMPLETED | OUTPATIENT
Start: 2019-09-14 | End: 2019-09-14

## 2019-09-14 RX ADMIN — WARFARIN SODIUM 8 MILLIGRAM(S): 2.5 TABLET ORAL at 03:01

## 2019-09-14 RX ADMIN — Medication 975 MILLIGRAM(S): at 03:01

## 2019-09-14 NOTE — ED ADULT NURSE REASSESSMENT NOTE - COMFORT CARE
Empty pt urinal.
plan of care explained/warm blanket provided/po fluids offered/repositioned/side rails down/treatment delay explained/wait time explained

## 2019-09-14 NOTE — ED ADULT NURSE REASSESSMENT NOTE - NS ED NURSE REASSESS COMMENT FT1
Called [facility] for patient to be discharged back with no answer.  notified. patient denies any family members able to  patient at this time. Will stay for social work in am. Patient agreable to plan of care.
Change of shift, received patient at this time. Patient denies chest pain or SOB at this time. lung sounds clear through out. Pt resting comfortably, VSS, no signs of distress at this time, CM in place,  awaiting repeat troponin results. safety maintained, call bell in reach.
LAte entry : Several attempts by SW to call [facility] , unable to speak with anyone, pt was send home via cab, SW to arranged cab going home, no distress noted, pt claims that he arrived with a walking cane, unable to locate cane in ED , pt provided with some snacks before discharge home , pt instructed to wait in the waiting room for 
Patient c/o 2/10 chest pain, patient states the pain has been intermittent since he came to ER. explained 2 troponin's and ekg were done. MD aware, no knew orders at this time. patient to be discharged with social work this am.
Late entry : Pt received in the stretcher resting comfortably, no distress noted, no chest pain reported ,, awaiting SW in AM and discharge back to [facility] , will continue to monitor

## 2019-09-14 NOTE — CHART NOTE - NSCHARTNOTEFT_GEN_A_CORE
Pt ready for d/c back to [facility] at [address] [place] [address] in [place]. SW attempted to find phone number with no success. SW met with pt at bedside who states he does not have the phone number as well. SW asked pt if this worker could call pt's wife but pt states he does not want her to know he is in the hospital and get worried. Pt also states that his wife does not know the number either. KRISTINE called mens [facility] on [address] [phone] and spoke to Dylan who states he will find out their phone number and call me back. SW following. Pt ready for d/c back to [facility] at [address] [facility] in [place]. SW attempted to find phone number with no success. SW met with pt at bedside who states he does not have the phone number as well. SW asked pt if this worker could call pt's wife but pt states he does not want her to know he is in the hospital and get worried. Pt also states that his wife does not know the number either. KRISTINE called [facility] on [address] [phone] and spoke to Dylan who states he contacted the supervisor Dylan Sheppard and left a msg for him to call me. SW never received call back. As per last admission on 9/10 pt was d/c to this address. Pt states he only has $4 on him and does not have any friends or family to pick him up. SW called Martha macias who states ride will be $11. KRISTINE called Manager Corinne who approved voucher. SW following. Pt ready for d/c back to [facility] at [address] [facility] in [place]. SW attempted to find phone number with no success. SW met with pt at bedside who states he does not have the phone number as well. SW asked pt if this worker could call pt's wife but pt states he does not want her to know he is in the hospital and get worried. Pt also states that his wife does not know the number either. KRISTINE called [facility] on [address] [phone] and spoke to Dylan who states he contacted the supervisor Dylan Sheppard and left a msg for him to call me. SW never received call back. As per last admission on 9/10 pt was d/c to this address. Pt states he only has $4 on him and does not have any friends or family to pick him up. SW called Martha macias who states ride will be $11. SW called Manager Corinne who approved voucher. Voucher provided to pt and colleen called. No need for KRISTINE to follow.

## 2021-03-24 NOTE — ED PROVIDER NOTE - MUSCULOSKELETAL NEGATIVE STATEMENT, MLM
1.  Obtain labs today  2.   Return to clinic in 3 months to see Dr. Issa Daniels with labs:  CBC
no back pain, no gout, no musculoskeletal pain, no neck pain, and no weakness.

## 2022-03-10 NOTE — ED ADULT NURSE NOTE - CHIEF COMPLAINT
Patient had an appointment scheduled at 3:45 pm. Physician waited for 15 minutes and patient did not show up. Physician called patient twice and patient did not answer.  
The patient is a 86y Male complaining of chest pain.

## 2022-06-11 NOTE — PATIENT PROFILE ADULT - ABILITY TO HEAR (WITH HEARING AID OR HEARING APPLIANCE IF NORMALLY USED):
Find out what dose she has been taking and make sure that is what she continues to take and correct the med list.    Adequate: hears normal conversation without difficulty Calm

## 2023-03-07 NOTE — ED PROVIDER NOTE - CARDIOVASCULAR [+], MLM
CHEST PAIN/near syncope

## 2024-04-25 NOTE — ED ADULT TRIAGE NOTE - NS ED NURSE BANDS TYPE
Name band;
Benefits, risks, and possible complications of procedure explained to patient/caregiver who verbalized understanding and gave verbal consent.

## 2024-09-17 NOTE — ED ADULT NURSE NOTE - BRADEN SCORE (IF 18 OR LESS ACTIVATE SKIN INJURY RISK INCREASED GUIDELINE), MLM
well developed, well nourished , in no acute distress , ambulating without difficulty , normal communication ability 16